# Patient Record
Sex: FEMALE | Race: WHITE | NOT HISPANIC OR LATINO | URBAN - METROPOLITAN AREA
[De-identification: names, ages, dates, MRNs, and addresses within clinical notes are randomized per-mention and may not be internally consistent; named-entity substitution may affect disease eponyms.]

---

## 2023-06-01 DIAGNOSIS — O28.0 ABNORMAL QUAD SCREEN: Primary | ICD-10-CM

## 2023-06-05 ENCOUNTER — OFFICE VISIT (OUTPATIENT)
Dept: MATERNAL FETAL MEDICINE | Facility: CLINIC | Age: 23
End: 2023-06-05
Payer: MEDICAID

## 2023-06-05 VITALS
RESPIRATION RATE: 20 BRPM | SYSTOLIC BLOOD PRESSURE: 112 MMHG | OXYGEN SATURATION: 99 % | HEIGHT: 63 IN | DIASTOLIC BLOOD PRESSURE: 64 MMHG | BODY MASS INDEX: 24.63 KG/M2 | WEIGHT: 139 LBS | HEART RATE: 70 BPM

## 2023-06-05 DIAGNOSIS — O28.0 ABNORMAL QUAD SCREEN: ICD-10-CM

## 2023-06-05 PROCEDURE — 3008F PR BODY MASS INDEX (BMI) DOCUMENTED: ICD-10-PCS | Mod: CPTII,S$GLB,, | Performed by: OBSTETRICS & GYNECOLOGY

## 2023-06-05 PROCEDURE — 3008F BODY MASS INDEX DOCD: CPT | Mod: CPTII,S$GLB,, | Performed by: OBSTETRICS & GYNECOLOGY

## 2023-06-05 PROCEDURE — 3074F SYST BP LT 130 MM HG: CPT | Mod: CPTII,S$GLB,, | Performed by: OBSTETRICS & GYNECOLOGY

## 2023-06-05 PROCEDURE — 1159F PR MEDICATION LIST DOCUMENTED IN MEDICAL RECORD: ICD-10-PCS | Mod: CPTII,S$GLB,, | Performed by: OBSTETRICS & GYNECOLOGY

## 2023-06-05 PROCEDURE — 3078F DIAST BP <80 MM HG: CPT | Mod: CPTII,S$GLB,, | Performed by: OBSTETRICS & GYNECOLOGY

## 2023-06-05 PROCEDURE — 99203 OFFICE O/P NEW LOW 30 MIN: CPT | Mod: TH,S$GLB,, | Performed by: OBSTETRICS & GYNECOLOGY

## 2023-06-05 PROCEDURE — 99203 PR OFFICE/OUTPT VISIT, NEW, LEVL III, 30-44 MIN: ICD-10-PCS | Mod: TH,S$GLB,, | Performed by: OBSTETRICS & GYNECOLOGY

## 2023-06-05 PROCEDURE — 1159F MED LIST DOCD IN RCRD: CPT | Mod: CPTII,S$GLB,, | Performed by: OBSTETRICS & GYNECOLOGY

## 2023-06-05 PROCEDURE — 3078F PR MOST RECENT DIASTOLIC BLOOD PRESSURE < 80 MM HG: ICD-10-PCS | Mod: CPTII,S$GLB,, | Performed by: OBSTETRICS & GYNECOLOGY

## 2023-06-05 PROCEDURE — 3074F PR MOST RECENT SYSTOLIC BLOOD PRESSURE < 130 MM HG: ICD-10-PCS | Mod: CPTII,S$GLB,, | Performed by: OBSTETRICS & GYNECOLOGY

## 2023-06-05 RX ORDER — ONDANSETRON HYDROCHLORIDE 8 MG/1
8 TABLET, FILM COATED ORAL 2 TIMES DAILY
COMMUNITY
Start: 2023-04-19

## 2023-06-05 NOTE — PROGRESS NOTES
"Janiya is here for initial MFM consultation, referred by Dr. Bear for Quad Screen positive for increased OSB risk.    She is feeling fetal movement.    Janiya denies vaginal bleeding, loss of fluid, recurrent cramping.  She does use Marijuana for nausea and anxiety.    She also smokes part of a cigar daily.      Vitals:    06/05/23 1153   BP: 112/64   Pulse: 70   Resp: 20   SpO2: 99%   Weight: 63 kg (139 lb)   Height: 5' 3" (1.6 m)      BMI:                    24.62 kg/m^2               "

## 2023-06-05 NOTE — LETTER
June 5, 2023        Rob Bear MD  [address]  [facility] [address]             [place] [facility]  [address]  [place] [address]  Phone: [phone]  Fax: [phone]   Patient: Faith Cryer   MR Number: [id number]   YOB: 2000   Date of Visit: 6/5/2023       Dear Dr. Bear:    Thank you for referring Faith Cryer to me for evaluation. Attached you will find relevant portions of my assessment and plan of care.    If you have questions, please do not hesitate to call me. I look forward to following Faith Cryer along with you.    Sincerely,      Radha Bacon, DO            CC  No Recipients    Enclosure

## 2023-06-27 DIAGNOSIS — O28.0 ABNORMAL QUAD SCREEN: Primary | ICD-10-CM

## 2023-07-03 ENCOUNTER — PROCEDURE VISIT (OUTPATIENT)
Dept: MATERNAL FETAL MEDICINE | Facility: CLINIC | Age: 23
End: 2023-07-03
Payer: MEDICAID

## 2023-07-03 VITALS
HEART RATE: 72 BPM | DIASTOLIC BLOOD PRESSURE: 50 MMHG | SYSTOLIC BLOOD PRESSURE: 110 MMHG | RESPIRATION RATE: 18 BRPM | BODY MASS INDEX: 25.33 KG/M2 | OXYGEN SATURATION: 99 % | WEIGHT: 143 LBS

## 2023-07-03 DIAGNOSIS — O28.0 ABNORMAL MSAFP (MATERNAL SERUM ALPHA-FETOPROTEIN), ELEVATED: Primary | ICD-10-CM

## 2023-07-03 DIAGNOSIS — O28.0 ABNORMAL QUAD SCREEN: ICD-10-CM

## 2023-07-03 DIAGNOSIS — O99.332 TOBACCO SMOKING AFFECTING PREGNANCY IN SECOND TRIMESTER: ICD-10-CM

## 2023-07-03 PROCEDURE — 76816 OB US FOLLOW-UP PER FETUS: CPT | Mod: 26,,, | Performed by: OBSTETRICS & GYNECOLOGY

## 2023-07-03 PROCEDURE — 99212 PR OFFICE/OUTPT VISIT, EST, LEVL II, 10-19 MIN: ICD-10-PCS | Mod: 25,TH,S$GLB, | Performed by: OBSTETRICS & GYNECOLOGY

## 2023-07-03 PROCEDURE — 76816 PR  US,PREGNANT UTERUS,F/U,TRANSABD APP: ICD-10-PCS | Mod: 26,,, | Performed by: OBSTETRICS & GYNECOLOGY

## 2023-07-03 PROCEDURE — 99212 OFFICE O/P EST SF 10 MIN: CPT | Mod: 25,TH,S$GLB, | Performed by: OBSTETRICS & GYNECOLOGY

## 2023-07-03 RX ORDER — ONDANSETRON 8 MG/1
TABLET, ORALLY DISINTEGRATING ORAL
COMMUNITY
Start: 2023-06-07

## 2023-07-03 NOTE — PROGRESS NOTES
Indication for follow up consultation:  Intrauterine pregnancy at 24w2d  Elevated MSAFP  Tobacco use    Provider requesting consultation: Rob Bear MD    Dear Dr. Bear,    I had the pleasure of seeing Faith Cryer for follow up consultation and sonography today.  Thank you again for allowing us to assist in her care.  As you recall she is a 23 y.o.  at 24w2d.  Findings at her initial visit here were reassuring but the lower spine could not be cleared.  Patient states she is cut back on her tobacco use.      Review of systems: The patient denies any vaginal bleeding, loss of fluid or contraction pain today.  Vitals:    23 1016   BP: (!) 110/50   Pulse: 72   Resp: 18   SpO2: 99%   Weight: 64.9 kg (143 lb)     Body mass index is 25.33 kg/m².      Physical exam:  Gen: WDWN in NAD  HEENT: WNL  Abdomen: Soft, non-tender, non-distended  Skin: No rash or jaundice  Extremities: Symmetric in size, no clubbing, cyanosis, or edema  Neuro: Grossly intact    Ultrasound:  A repeat detailed fetal anatomical survey was completed once again today.  We demonstrated a Shah gestation in breech presentation.  Heart rate 149 beats per minute.  Placenta anterior and grade 1.  Three-vessel cord is seen.  Amniotic fluid volume is normal.  Baby is at the 29th percentile or 1 lb 6 oz and abdominal circumferences normal.  Detailed fetal anatomic survey was repeated today.  No structure malformations were seen.  We were able to clear the fetal spine today.  Please SEE ATTACHED REPORT for full details.      Assessment:  Intrauterine pregnancy at 24w2d  Elevated MSAFP    Recommendations:   I am glad to report they were able to clear the fetal anatomy today.  Fetal growth is on target.  We do not have to see her back here again unless complications arise and you require assistance.  I do recommend growth scans every 4 weeks from this point forward.      We greatly appreciate you allowing us to assist in her care.  Please  call with any questions or concerns.    Sincerely,    Irving Carrillo Jr., M.D.  Maternal Fetal Medicine    Total time personally involved in this patient's care was 19 minutes.

## 2023-07-03 NOTE — PROGRESS NOTES
Janiya is here for followup M consultation for elevated MSAFP and tobacco use in pregnancy, referred by Dr. Bear.    She is feeling fetal movement.    Janiya denies vaginal bleeding, loss of fluid, recurrent cramping.     She takes Zofran 8 mg ODT prn nausea not relieved by THC (smokes it about twice a week.) She also still smokes 1 cigar per week.    Vitals:    07/03/23 1016   BP: (!) 110/50   Pulse: 72   Resp: 18   SpO2: 99%   Weight: 64.9 kg (143 lb)     BMI:                    25.33 kg/m^2

## 2023-07-03 NOTE — LETTER
July 3, 2023        MD Grace Mohan [place]             [place] [facility]  [address]  [place] [address]  Phone: [phone]  Fax: [phone]   Patient: Faith Cryer   MR Number: [id number]   YOB: 2000   Date of Visit: 7/3/2023       Dear Dr. Bear:    Thank you for referring Faith Cryer to me for evaluation. Below are the relevant portions of my assessment and plan of care.            If you have questions, please do not hesitate to call me. I look forward to following Janiya along with you.    Sincerely,      Irving Carrillo Jr., MD           CC  No Recipients

## 2023-09-05 DIAGNOSIS — O36.5990 IUGR, ANTENATAL: Primary | ICD-10-CM

## 2023-09-11 ENCOUNTER — PROCEDURE VISIT (OUTPATIENT)
Dept: MATERNAL FETAL MEDICINE | Facility: CLINIC | Age: 23
End: 2023-09-11
Payer: MEDICAID

## 2023-09-11 VITALS
WEIGHT: 152 LBS | DIASTOLIC BLOOD PRESSURE: 70 MMHG | BODY MASS INDEX: 26.93 KG/M2 | SYSTOLIC BLOOD PRESSURE: 126 MMHG | OXYGEN SATURATION: 97 % | HEART RATE: 94 BPM | RESPIRATION RATE: 20 BRPM

## 2023-09-11 DIAGNOSIS — O36.5990 IUGR, ANTENATAL: ICD-10-CM

## 2023-09-11 PROCEDURE — 76820 PR US, OB DOPPLER, FETAL UMBILICAL ARTERY ECHO: ICD-10-PCS | Mod: S$GLB,,, | Performed by: OBSTETRICS & GYNECOLOGY

## 2023-09-11 PROCEDURE — 99203 OFFICE O/P NEW LOW 30 MIN: CPT | Mod: TH,S$GLB,, | Performed by: OBSTETRICS & GYNECOLOGY

## 2023-09-11 PROCEDURE — 76819 FETAL BIOPHYS PROFIL W/O NST: CPT | Mod: S$GLB,,, | Performed by: OBSTETRICS & GYNECOLOGY

## 2023-09-11 PROCEDURE — 76811 PR US, OB FETAL EVAL & EXAM, TRANSABDOM,FIRST GESTATION: ICD-10-PCS | Mod: S$GLB,,, | Performed by: OBSTETRICS & GYNECOLOGY

## 2023-09-11 PROCEDURE — 76811 OB US DETAILED SNGL FETUS: CPT | Mod: S$GLB,,, | Performed by: OBSTETRICS & GYNECOLOGY

## 2023-09-11 PROCEDURE — 76820 UMBILICAL ARTERY ECHO: CPT | Mod: S$GLB,,, | Performed by: OBSTETRICS & GYNECOLOGY

## 2023-09-11 PROCEDURE — 76819 PR US, OB, FETAL BIOPHYSICAL, W/O NST: ICD-10-PCS | Mod: S$GLB,,, | Performed by: OBSTETRICS & GYNECOLOGY

## 2023-09-11 PROCEDURE — 99203 PR OFFICE/OUTPT VISIT, NEW, LEVL III, 30-44 MIN: ICD-10-PCS | Mod: TH,S$GLB,, | Performed by: OBSTETRICS & GYNECOLOGY

## 2023-09-11 NOTE — LETTER
September 11, 2023        Rob Bear MD  [address]  [facility] [address]             [place] - [facility]  [address]  [place] [address]  Phone: [phone]  Fax: [phone]   Patient: Faith Cryer   MR Number: [id number]   YOB: 2000   Date of Visit: 9/11/2023       Dear Dr. Bear:    Thank you for referring Faith Cryer to me for evaluation. Below are the relevant portions of my assessment and plan of care.            If you have questions, please do not hesitate to call me. I look forward to following Janiya along with you.    Sincerely,      Iveth Segovia MD           CC  No Recipients

## 2023-09-11 NOTE — PROGRESS NOTES
Janiya is here for followup [facility] consultation for new diagnosis of IUGR, referred by Dr. Bear.    She is feeling fetal movement.    Janiya denies vaginal bleeding, loss of fluid; she states that she was having contractions every 10 minutes when monitoring in L&D 1-2 weeks ago, but cervix was unchanged and she was discharged to home after a couple of hours.    She states that she has not smoked any cigars or used and THC x 5 weeks.    Vitals:    09/11/23 1008   BP: 126/70   Pulse: 94   Resp: 20   SpO2: 97%   Weight: 68.9 kg (152 lb)     BMI:                    26.93 kg/m^2

## 2023-09-11 NOTE — PROGRESS NOTES
Follow-up [facility] Consultation  Referring provider: Dr. Bear    Indications for referral:  1) Pregnancy at  34 weeks and 3 days  (EDC 10-20-23)  2) Suspected fetal growth restriction    Dear Dr. Bear,  Thank you for your kind request for consultation and imaging of your patient at the [facility].  We initially saw this patient due to an elevated MSAFP of 2.58 MoM.  She returns today because fetal growth restriction was suspected on an ultrasound done in your office.  Since her last visit here she has stopped smoking cigars and marijuana.     Physical Exam  Vital signs:  126/70, 94, 20, 97%  General: Age appearing female in no apparent distress.  ABDOMEN:  Gravid, soft, nontender  Uterus: Nontender, appropriate height for gestational age    ULTRASOUND FINDINGS:  A repeat ultrasound was performed. The fetus is cephalic. EFW of 2086 grams is at the 12th percentile.  Abdominal circumference is at the 8th percentile consistent with fetal growth restriction. The placenta is anterior.  Amniotic fluid is normal. There are no fetal structural malformations to extent of view.  Umbilical artery Doppler studies show normal impedance to flow.  BPP is 8/8.    IMPRESSION:     1) 34 week gestation  2) Fetal growth restriction with normal UA Dopplers    RECOMMENDATIONS/DISCUSSION:  1) We discussed the sonographic findings and their implications.  She was advised of the definition of fetal growth restriction and some of the causes.  In most cases, we cannot determine definitive cause.  Given that the Dopplers are normal, this is likely a constitutionally small fetus.  Prognosis is excellent.  She should have  testing in your office until delivery which is recommended at 38-39 weeks (unless  testing is nonreassuring).  We will see her on one additional occasion in 2 weeks to repeat Dopplers.  We should not need to see her thereafter.    Thank you for allowing us to  participate in her care.  Please do not hesitate to call with questions.   -Iveth Segovia MD

## 2023-09-19 DIAGNOSIS — O36.5990 IUGR, ANTENATAL: Primary | ICD-10-CM

## 2023-09-24 NOTE — PROGRESS NOTES
Indication for follow up consultation:  Intrauterine pregnancy at 36w2d  IUGR by small AC  Elevated MSAFP    Provider requesting consultation: Rob Bear MD    Dear Dr. Bear,    I had the pleasure of seeing Faith Cryer for follow up consultation and sonography today.  Thank you again for allowing us to assist in her care.  As you recall she is a 23 y.o.  at 36w2d.  When we saw her 2 weeks ago the EFW was at the 12th percentile and AC at the 8th percentile.   testing and Dopplers were normal.  She is been undergoing twice weekly  testing with biophysical profiles and nonstress tests.  She reports normal fetal movement and denies any vaginal bleeding, leakage of fluid, or cramping.    Review of systems: The patient denies any vaginal bleeding, loss of fluid or contraction pain today.  Vitals:    23 1139   BP: 124/80   Pulse: 82   Resp: 20   SpO2: 99%   Weight: 73.5 kg (162 lb)     Body mass index is 28.7 kg/m².      Physical exam:  Gen: WDWN in NAD  HEENT: WNL  Abdomen: Soft, non-tender, non-distended  Skin: No rash or jaundice  Extremities: Symmetric in size, no clubbing, cyanosis, or edema  Neuro: Grossly intact    Ultrasound:  A repeat detailed fetal anatomical survey was completed once again today.  We demonstrated a Shah in cephalic presentation.  BPP is 8/8.  The baby measured 5 lb 9 oz which is the 13th percentile and abdominal circumference was at the 11th percentile.  Dopplers were therefore not indicated.  Please SEE ATTACHED REPORT for full details.      Assessment:  Intrauterine pregnancy at 36w2d  Reassuring fetal growth profile, suspect constitutionally small baby    Recommendations:   Today both the abdominal circumference and overall weight are both above the 10th percentile.   testing is reassuring.  Patient reports good fetal movement.  Both she and the father of the baby were in the 6-1/2 lb range when they were born as was most of the other family  members so I suspect this is a constitutionally small baby.  Because of this she does not need to be induced before 39 weeks as long as  testing remains reassuring.  Allowing her to go into labor on her own is completely appropriate or she can be induced at 39 weeks.  I will leave that up to you and the patient's discretion.  I would not push the pregnancy beyond 40 weeks and 0 days.  I discussed warning signs and reasons to report to the hospital.  Patient is currently performing kick counts.  Please continue your current regimen of  testing.      We greatly appreciate you allowing us to assist in her care.  Please call with any questions or concerns.    Sincerely,    Irving Carrillo Jr., M.D.  Maternal Fetal Medicine    Total time personally involved in this patient's care was 25 minutes.

## 2023-09-25 ENCOUNTER — PROCEDURE VISIT (OUTPATIENT)
Dept: MATERNAL FETAL MEDICINE | Facility: CLINIC | Age: 23
End: 2023-09-25
Payer: MEDICAID

## 2023-09-25 VITALS
DIASTOLIC BLOOD PRESSURE: 80 MMHG | HEART RATE: 82 BPM | RESPIRATION RATE: 20 BRPM | OXYGEN SATURATION: 99 % | SYSTOLIC BLOOD PRESSURE: 124 MMHG | WEIGHT: 162 LBS | BODY MASS INDEX: 28.7 KG/M2

## 2023-09-25 DIAGNOSIS — O36.5930 POOR FETAL GROWTH AFFECTING MANAGEMENT OF MOTHER IN THIRD TRIMESTER, SINGLE OR UNSPECIFIED FETUS: Primary | ICD-10-CM

## 2023-09-25 DIAGNOSIS — O36.5990 IUGR, ANTENATAL: ICD-10-CM

## 2023-09-25 PROCEDURE — 76819 FETAL BIOPHYS PROFIL W/O NST: CPT | Mod: 26,,, | Performed by: OBSTETRICS & GYNECOLOGY

## 2023-09-25 PROCEDURE — 76816 OB US FOLLOW-UP PER FETUS: CPT | Mod: 26,,, | Performed by: OBSTETRICS & GYNECOLOGY

## 2023-09-25 PROCEDURE — 99213 PR OFFICE/OUTPT VISIT, EST, LEVL III, 20-29 MIN: ICD-10-PCS | Mod: 25,TH,S$GLB, | Performed by: OBSTETRICS & GYNECOLOGY

## 2023-09-25 PROCEDURE — 76819 PR US, OB, FETAL BIOPHYSICAL, W/O NST: ICD-10-PCS | Mod: 26,,, | Performed by: OBSTETRICS & GYNECOLOGY

## 2023-09-25 PROCEDURE — 76816 PR  US,PREGNANT UTERUS,F/U,TRANSABD APP: ICD-10-PCS | Mod: 26,,, | Performed by: OBSTETRICS & GYNECOLOGY

## 2023-09-25 PROCEDURE — 99213 OFFICE O/P EST LOW 20 MIN: CPT | Mod: 25,TH,S$GLB, | Performed by: OBSTETRICS & GYNECOLOGY

## 2023-09-25 NOTE — PROGRESS NOTES
"Janiya is here for followup MFM consultation for IUGR and increased risk for OSB on Quad Screen, referred by Dr. Bear.    She is feeling fetal movement.    Janiya denies vaginal bleeding, loss of fluid; she is having irregular contractions.    She takes Zofran 8 mg PO ODT prn nausea, "not often."    Vitals:    09/25/23 1139   BP: 124/80   Pulse: 82   Resp: 20   SpO2: 99%   Weight: 73.5 kg (162 lb)     BMI:                    28.7 kg/m^2   "

## 2023-09-25 NOTE — LETTER
September 25, 2023        MD Grace Mohan [place]             [place] - [facility]etal [facility]  [address]  [place] [address]  Phone: [phone]  Fax: [phone]   Patient: Faith Cryer   MR Number: [id number]   YOB: 2000   Date of Visit: 9/25/2023       Dear Dr. Bear:    Thank you for referring Faith Cryer to me for evaluation. Below are the relevant portions of my assessment and plan of care.            If you have questions, please do not hesitate to call me. I look forward to following Janiya along with you.    Sincerely,      Irving Carrillo Jr., MD           CC  No Recipients

## 2023-12-25 PROBLEM — O36.5930 POOR FETAL GROWTH AFFECTING MANAGEMENT OF MOTHER IN THIRD TRIMESTER: Status: RESOLVED | Noted: 2023-09-25 | Resolved: 2023-12-25

## 2024-06-10 PROBLEM — O28.0 ABNORMAL MSAFP (MATERNAL SERUM ALPHA-FETOPROTEIN), ELEVATED: Status: RESOLVED | Noted: 2023-07-03 | Resolved: 2024-06-10

## 2025-01-05 ENCOUNTER — OUTSIDE PLACE OF SERVICE (OUTPATIENT)
Dept: OBSTETRICS AND GYNECOLOGY | Facility: CLINIC | Age: 25
End: 2025-01-05
Payer: MEDICAID

## 2025-01-05 PROCEDURE — 99222 1ST HOSP IP/OBS MODERATE 55: CPT | Mod: ,,, | Performed by: OBSTETRICS & GYNECOLOGY

## 2025-01-06 ENCOUNTER — OUTSIDE PLACE OF SERVICE (OUTPATIENT)
Dept: OBSTETRICS AND GYNECOLOGY | Facility: CLINIC | Age: 25
End: 2025-01-06
Payer: MEDICAID

## 2025-01-06 PROCEDURE — 59514 CESAREAN DELIVERY ONLY: CPT | Mod: AT,,, | Performed by: OBSTETRICS & GYNECOLOGY

## 2025-01-07 PROCEDURE — 99232 SBSQ HOSP IP/OBS MODERATE 35: CPT | Mod: ,,, | Performed by: OBSTETRICS & GYNECOLOGY

## 2025-01-09 PROCEDURE — 99238 HOSP IP/OBS DSCHRG MGMT 30/<: CPT | Mod: ,,, | Performed by: OBSTETRICS & GYNECOLOGY

## 2025-01-23 ENCOUNTER — TELEPHONE (OUTPATIENT)
Dept: OBSTETRICS AND GYNECOLOGY | Facility: CLINIC | Age: 25
End: 2025-01-23
Payer: MEDICAID

## 2025-01-30 ENCOUNTER — POSTPARTUM VISIT (OUTPATIENT)
Dept: OBSTETRICS AND GYNECOLOGY | Facility: CLINIC | Age: 25
End: 2025-01-30
Payer: MEDICAID

## 2025-01-30 VITALS
DIASTOLIC BLOOD PRESSURE: 70 MMHG | WEIGHT: 152 LBS | BODY MASS INDEX: 26.93 KG/M2 | SYSTOLIC BLOOD PRESSURE: 109 MMHG | HEART RATE: 67 BPM

## 2025-01-30 RX ORDER — NORETHINDRONE ACETATE AND ETHINYL ESTRADIOL AND FERROUS FUMARATE 1MG-20(24)
1 KIT ORAL DAILY
Qty: 28 TABLET | Refills: 11 | Status: SHIPPED | OUTPATIENT
Start: 2025-01-30 | End: 2026-01-30

## 2025-01-30 RX ORDER — ESCITALOPRAM OXALATE 10 MG/1
10 TABLET ORAL DAILY
Qty: 30 TABLET | Refills: 2 | Status: SHIPPED | OUTPATIENT
Start: 2025-01-30 | End: 2026-01-30

## 2025-01-30 NOTE — PROGRESS NOTES
Subjective:       Patient ID: Faith Cryer is a 24 y.o. female.    Chief Complaint:  Postpartum Care      History of Present Illness  Here for 6 wk pp exam sp  secondary to suspected chorio with  PPROM   Complaints + depression and anxiety. Denies SI/HI     Review of Systems  Review of Systems   Constitutional:  Negative for activity change, appetite change, chills, diaphoresis and fever.   Respiratory:  Negative for shortness of breath.    Cardiovascular:  Negative for chest pain.   Gastrointestinal:  Negative for abdominal pain, bloating, constipation, nausea and vomiting.   Genitourinary:  Negative for dysuria, flank pain, hematuria and menorrhagia.   Integumentary:  Negative for breast mass, breast skin changes and breast tenderness.   Neurological:  Negative for headaches.   Psychiatric/Behavioral:  Negative for depression. The patient is not nervous/anxious.    Breast: Negative for lump, mass, mastodynia, skin changes and tenderness          Objective:    Physical Exam:   Constitutional: She appears well-developed and well-nourished. No distress.    HENT:   Head: Normocephalic.    Eyes: Conjunctivae and EOM are normal.    Neck: No tracheal deviation present. No thyromegaly present.    Cardiovascular:       Exam reveals no clubbing, no cyanosis and no edema.        Pulmonary/Chest: Effort normal. No respiratory distress.                  Musculoskeletal: Normal range of motion and moves all extremeties.        Skin: No rash noted. She is not diaphoretic. No cyanosis. Nails show no clubbing.    Psychiatric: She has a normal mood and affect. Her behavior is normal. Judgment and thought content normal.          Assessment:     Postpartum  Depression screen positive  bottle feeding     Plan:   rtc for annual or prn  Contraception start ocps   Start lexapro due to PPD   Preventative screening utd

## 2025-01-31 ENCOUNTER — PATIENT MESSAGE (OUTPATIENT)
Dept: OBSTETRICS AND GYNECOLOGY | Facility: CLINIC | Age: 25
End: 2025-01-31
Payer: MEDICAID

## 2025-02-17 ENCOUNTER — PATIENT MESSAGE (OUTPATIENT)
Dept: OBSTETRICS AND GYNECOLOGY | Facility: CLINIC | Age: 25
End: 2025-02-17
Payer: MEDICAID